# Patient Record
Sex: MALE | Race: WHITE | NOT HISPANIC OR LATINO | Employment: OTHER | URBAN - METROPOLITAN AREA
[De-identification: names, ages, dates, MRNs, and addresses within clinical notes are randomized per-mention and may not be internally consistent; named-entity substitution may affect disease eponyms.]

---

## 2022-09-09 RX ORDER — METHYLPREDNISOLONE 4 MG
1 TABLET, DOSE PACK ORAL DAILY
COMMUNITY

## 2022-09-09 RX ORDER — OMEPRAZOLE 20 MG/1
20 CAPSULE, DELAYED RELEASE ORAL DAILY
COMMUNITY
Start: 2022-06-27

## 2022-09-09 RX ORDER — ALLOPURINOL 100 MG/1
100 TABLET ORAL DAILY
COMMUNITY
Start: 2022-06-27

## 2022-09-15 ENCOUNTER — OFFICE VISIT (OUTPATIENT)
Dept: NEPHROLOGY | Facility: CLINIC | Age: 67
End: 2022-09-15
Payer: COMMERCIAL

## 2022-09-15 VITALS
SYSTOLIC BLOOD PRESSURE: 142 MMHG | DIASTOLIC BLOOD PRESSURE: 85 MMHG | WEIGHT: 177 LBS | BODY MASS INDEX: 25.34 KG/M2 | HEART RATE: 60 BPM | HEIGHT: 70 IN | OXYGEN SATURATION: 100 %

## 2022-09-15 DIAGNOSIS — N18.32 STAGE 3B CHRONIC KIDNEY DISEASE: Primary | ICD-10-CM

## 2022-09-15 PROCEDURE — 3288F FALL RISK ASSESSMENT DOCD: CPT | Mod: CPTII,S$GLB,, | Performed by: INTERNAL MEDICINE

## 2022-09-15 PROCEDURE — 1160F PR REVIEW ALL MEDS BY PRESCRIBER/CLIN PHARMACIST DOCUMENTED: ICD-10-PCS | Mod: CPTII,S$GLB,, | Performed by: INTERNAL MEDICINE

## 2022-09-15 PROCEDURE — 99213 PR OFFICE/OUTPT VISIT, EST, LEVL III, 20-29 MIN: ICD-10-PCS | Mod: S$GLB,,, | Performed by: INTERNAL MEDICINE

## 2022-09-15 PROCEDURE — 1101F PT FALLS ASSESS-DOCD LE1/YR: CPT | Mod: CPTII,S$GLB,, | Performed by: INTERNAL MEDICINE

## 2022-09-15 PROCEDURE — 3077F SYST BP >= 140 MM HG: CPT | Mod: CPTII,S$GLB,, | Performed by: INTERNAL MEDICINE

## 2022-09-15 PROCEDURE — 99213 OFFICE O/P EST LOW 20 MIN: CPT | Mod: S$GLB,,, | Performed by: INTERNAL MEDICINE

## 2022-09-15 PROCEDURE — 3079F PR MOST RECENT DIASTOLIC BLOOD PRESSURE 80-89 MM HG: ICD-10-PCS | Mod: CPTII,S$GLB,, | Performed by: INTERNAL MEDICINE

## 2022-09-15 PROCEDURE — 1160F RVW MEDS BY RX/DR IN RCRD: CPT | Mod: CPTII,S$GLB,, | Performed by: INTERNAL MEDICINE

## 2022-09-15 PROCEDURE — 3008F PR BODY MASS INDEX (BMI) DOCUMENTED: ICD-10-PCS | Mod: CPTII,S$GLB,, | Performed by: INTERNAL MEDICINE

## 2022-09-15 PROCEDURE — 1159F PR MEDICATION LIST DOCUMENTED IN MEDICAL RECORD: ICD-10-PCS | Mod: CPTII,S$GLB,, | Performed by: INTERNAL MEDICINE

## 2022-09-15 PROCEDURE — 1159F MED LIST DOCD IN RCRD: CPT | Mod: CPTII,S$GLB,, | Performed by: INTERNAL MEDICINE

## 2022-09-15 PROCEDURE — 3008F BODY MASS INDEX DOCD: CPT | Mod: CPTII,S$GLB,, | Performed by: INTERNAL MEDICINE

## 2022-09-15 PROCEDURE — 3079F DIAST BP 80-89 MM HG: CPT | Mod: CPTII,S$GLB,, | Performed by: INTERNAL MEDICINE

## 2022-09-15 PROCEDURE — 3288F PR FALLS RISK ASSESSMENT DOCUMENTED: ICD-10-PCS | Mod: CPTII,S$GLB,, | Performed by: INTERNAL MEDICINE

## 2022-09-15 PROCEDURE — 3066F PR DOCUMENTATION OF TREATMENT FOR NEPHROPATHY: ICD-10-PCS | Mod: CPTII,S$GLB,, | Performed by: INTERNAL MEDICINE

## 2022-09-15 PROCEDURE — 3077F PR MOST RECENT SYSTOLIC BLOOD PRESSURE >= 140 MM HG: ICD-10-PCS | Mod: CPTII,S$GLB,, | Performed by: INTERNAL MEDICINE

## 2022-09-15 PROCEDURE — 1101F PR PT FALLS ASSESS DOC 0-1 FALLS W/OUT INJ PAST YR: ICD-10-PCS | Mod: CPTII,S$GLB,, | Performed by: INTERNAL MEDICINE

## 2022-09-15 PROCEDURE — 3066F NEPHROPATHY DOC TX: CPT | Mod: CPTII,S$GLB,, | Performed by: INTERNAL MEDICINE

## 2022-09-15 NOTE — PROGRESS NOTES
"Nephrology Progress Note      Patient Name:  Neymar Carmichael    :  1955    Medical Record:  [id number]    Interval History:  Seeing for ckd.    Subjective:  Patient seen and examined. No complaints.    Current Medications: Current medications reviewed.    Review of Systems  Review of Systems   Respiratory:  Negative for cough, hemoptysis, sputum production and shortness of breath.    Cardiovascular:  Negative for chest pain, palpitations, orthopnea, claudication and leg swelling.   Gastrointestinal:  Negative for abdominal pain, blood in stool, constipation, diarrhea, heartburn, nausea and vomiting.   Genitourinary:  Negative for dysuria, frequency, hematuria and urgency.     Objective:      Physical Exam:   Vital Signs:  BP (!) 142/85 (BP Location: Left arm, Patient Position: Sitting, BP Method: Large (Automatic))   Pulse 60   Ht 5' 10" (1.778 m)   Wt 80.3 kg (177 lb)   SpO2 100%   BMI 25.40 kg/m²   Constitutional:  WNL  HENT:  Normocephalic, Atraumatic, Pupils Reactive, Normal oropharynx, Nose normal.   Cardiovascular:  Normal heart rate, Normal rhythm, No murmurs, No rubs, No gallops.   Respiratory:  Normal breath sounds, No respiratory distress, No wheezing, No chest tenderness.   GI:  Bowel sounds normal, Soft, No tenderness, No masses, No pulsatile masses.  : No costovertebral angle tenderness    Extremities:  Intact distal pulses, No edema, No tenderness, No cyanosis, No clubbing.   Neurologic:  Alert & oriented x 3, Normal motor function, Normal sensory function, No focal deficits noted.  Skin:  Warm and dry      Labs:  No results for input(s): K, CO2, BUN, ALBUMIN, CALCIUM, GLU, HGB, WBC, PLT, INR in the last 72 hours.    Invalid input(s): CRE  .    Radiology:   none    Assessment:  Ckd 3b. - gfr is better.      Plan:  RTC 6 months.    Electronically signed by: Sole Urban, 9/15/2022 11:50 AM.      "

## 2023-03-13 ENCOUNTER — TELEPHONE (OUTPATIENT)
Dept: NEPHROLOGY | Facility: CLINIC | Age: 68
End: 2023-03-13
Payer: COMMERCIAL

## 2023-03-13 NOTE — TELEPHONE ENCOUNTER
----- Message from Preeti Colon sent at 3/13/2023 12:08 PM CDT -----  Contact: [phone]  Type: Needs Medical Advice  Who Called:  Pt     Best Call Back Number: [phone]  Additional Information: Pt stated he has a bacterial infection and is being treated at the hospital right. Pt asking if you still want to see him for his appt on March 15. Pls call back and advise          
Notified pt to keep appt. Pt verbalized understanding.  
Normal vision: sees adequately in most situations; can see medication labels, newsprint

## 2023-03-15 ENCOUNTER — OFFICE VISIT (OUTPATIENT)
Dept: NEPHROLOGY | Facility: CLINIC | Age: 68
End: 2023-03-15
Payer: COMMERCIAL

## 2023-03-15 VITALS
WEIGHT: 183.19 LBS | HEIGHT: 70 IN | BODY MASS INDEX: 26.22 KG/M2 | SYSTOLIC BLOOD PRESSURE: 132 MMHG | OXYGEN SATURATION: 99 % | DIASTOLIC BLOOD PRESSURE: 85 MMHG | HEART RATE: 64 BPM

## 2023-03-15 DIAGNOSIS — N18.4 STAGE 4 CHRONIC KIDNEY DISEASE: Primary | ICD-10-CM

## 2023-03-15 DIAGNOSIS — N39.0 UTI (URINARY TRACT INFECTION), UNCOMPLICATED: ICD-10-CM

## 2023-03-15 DIAGNOSIS — N25.81 SECONDARY HYPERPARATHYROIDISM OF RENAL ORIGIN: ICD-10-CM

## 2023-03-15 PROCEDURE — 1126F PR PAIN SEVERITY QUANTIFIED, NO PAIN PRESENT: ICD-10-PCS | Mod: CPTII,S$GLB,, | Performed by: INTERNAL MEDICINE

## 2023-03-15 PROCEDURE — 3066F PR DOCUMENTATION OF TREATMENT FOR NEPHROPATHY: ICD-10-PCS | Mod: CPTII,S$GLB,, | Performed by: INTERNAL MEDICINE

## 2023-03-15 PROCEDURE — 3008F PR BODY MASS INDEX (BMI) DOCUMENTED: ICD-10-PCS | Mod: CPTII,S$GLB,, | Performed by: INTERNAL MEDICINE

## 2023-03-15 PROCEDURE — 3008F BODY MASS INDEX DOCD: CPT | Mod: CPTII,S$GLB,, | Performed by: INTERNAL MEDICINE

## 2023-03-15 PROCEDURE — 1160F PR REVIEW ALL MEDS BY PRESCRIBER/CLIN PHARMACIST DOCUMENTED: ICD-10-PCS | Mod: CPTII,S$GLB,, | Performed by: INTERNAL MEDICINE

## 2023-03-15 PROCEDURE — 3079F PR MOST RECENT DIASTOLIC BLOOD PRESSURE 80-89 MM HG: ICD-10-PCS | Mod: CPTII,S$GLB,, | Performed by: INTERNAL MEDICINE

## 2023-03-15 PROCEDURE — 3066F NEPHROPATHY DOC TX: CPT | Mod: CPTII,S$GLB,, | Performed by: INTERNAL MEDICINE

## 2023-03-15 PROCEDURE — 99214 OFFICE O/P EST MOD 30 MIN: CPT | Mod: S$GLB,,, | Performed by: INTERNAL MEDICINE

## 2023-03-15 PROCEDURE — 1126F AMNT PAIN NOTED NONE PRSNT: CPT | Mod: CPTII,S$GLB,, | Performed by: INTERNAL MEDICINE

## 2023-03-15 PROCEDURE — 3288F PR FALLS RISK ASSESSMENT DOCUMENTED: ICD-10-PCS | Mod: CPTII,S$GLB,, | Performed by: INTERNAL MEDICINE

## 2023-03-15 PROCEDURE — 3075F PR MOST RECENT SYSTOLIC BLOOD PRESS GE 130-139MM HG: ICD-10-PCS | Mod: CPTII,S$GLB,, | Performed by: INTERNAL MEDICINE

## 2023-03-15 PROCEDURE — 3075F SYST BP GE 130 - 139MM HG: CPT | Mod: CPTII,S$GLB,, | Performed by: INTERNAL MEDICINE

## 2023-03-15 PROCEDURE — 1101F PT FALLS ASSESS-DOCD LE1/YR: CPT | Mod: CPTII,S$GLB,, | Performed by: INTERNAL MEDICINE

## 2023-03-15 PROCEDURE — 1101F PR PT FALLS ASSESS DOC 0-1 FALLS W/OUT INJ PAST YR: ICD-10-PCS | Mod: CPTII,S$GLB,, | Performed by: INTERNAL MEDICINE

## 2023-03-15 PROCEDURE — 99214 PR OFFICE/OUTPT VISIT, EST, LEVL IV, 30-39 MIN: ICD-10-PCS | Mod: S$GLB,,, | Performed by: INTERNAL MEDICINE

## 2023-03-15 PROCEDURE — 1159F PR MEDICATION LIST DOCUMENTED IN MEDICAL RECORD: ICD-10-PCS | Mod: CPTII,S$GLB,, | Performed by: INTERNAL MEDICINE

## 2023-03-15 PROCEDURE — 3288F FALL RISK ASSESSMENT DOCD: CPT | Mod: CPTII,S$GLB,, | Performed by: INTERNAL MEDICINE

## 2023-03-15 PROCEDURE — 1159F MED LIST DOCD IN RCRD: CPT | Mod: CPTII,S$GLB,, | Performed by: INTERNAL MEDICINE

## 2023-03-15 PROCEDURE — 3079F DIAST BP 80-89 MM HG: CPT | Mod: CPTII,S$GLB,, | Performed by: INTERNAL MEDICINE

## 2023-03-15 PROCEDURE — 1160F RVW MEDS BY RX/DR IN RCRD: CPT | Mod: CPTII,S$GLB,, | Performed by: INTERNAL MEDICINE

## 2023-03-15 RX ORDER — BETHANECHOL CHLORIDE 25 MG/1
25 TABLET ORAL 2 TIMES DAILY
COMMUNITY
Start: 2023-03-08 | End: 2023-11-02

## 2023-03-15 RX ORDER — MEROPENEM 1 G/1
1 INJECTION, POWDER, FOR SOLUTION INTRAVENOUS
COMMUNITY
Start: 2023-03-13 | End: 2023-03-31

## 2023-03-15 NOTE — PROGRESS NOTES
Pt Name:  Neymar Carmichael  Pt :  1955  Pt MRN:  [id number]    Date: 3/15/2023  Provider: Sole Urban    Reason for visit: seeing for ckd.      Chief Complaint:   Chief Complaint   Patient presents with    Chronic Kidney Disease     Stage-4, Cr-2.67, GFR-26       HPI:  HPI   On iv antibx for uti. Has urology appt in 2 wks with scope. No other complaints, no gi, card, or pulm complaints.    History:   Past Medical History:   Diagnosis Date    Anemia     CKD (chronic kidney disease)     CKD (chronic kidney disease), stage IV     Diverticulosis     GERD (gastroesophageal reflux disease)     Hiatal hernia     Urinary tract infection      Past Surgical History:   Procedure Laterality Date    CHOLECYSTECTOMY      COLONOSCOPY      TONSILLECTOMY       Family History   Problem Relation Age of Onset    Stroke Father     Diabetes Father     Coronary artery disease Father     Kidney disease Maternal Grandfather      Social History     Substance and Sexual Activity   Alcohol Use Yes    Alcohol/week: 1.0 standard drink    Types: 1 Glasses of wine per week     Social History     Substance and Sexual Activity   Drug Use Never     Social History     Substance and Sexual Activity   Sexual Activity Not Currently     reports that he is not currently sexually active.  Social History     Tobacco Use   Smoking Status Former    Types: Cigars   Smokeless Tobacco Never       Allergies:  Review of patient's allergies indicates:  No Known Allergies    Current Outpatient Medications   Medication Sig Dispense Refill    allopurinoL (ZYLOPRIM) 100 MG tablet Take 100 mg by mouth once daily.      bethanechol (URECHOLINE) 25 MG Tab Take 25 mg by mouth 2 (two) times daily.      glucosamine sulfate 500 mg Tab Take 1 tablet by mouth once daily.      meropenem (MERREM) 1 gram injection 1 g.      omeprazole (PRILOSEC) 20 MG capsule Take 20 mg by mouth once daily.       No current facility-administered medications for this visit.        ROS:  "  Constitutional:  Denies fever or chills   Eyes:  Denies change in visual acuity   HENT:  Denies nasal congestion or sore throat   Respiratory:  As in the history of the present illness.   Cardiovascular:  As in the history of the present illness.   GI:  As in the history of the present illness.    Musculoskeletal:  Denies back pain or joint pain   Integument:  Denies rash   Neurologic:  Denies headache, focal weakness or sensory changes   Endocrine:  Denies polyuria or polydipsia   Lymphatic:  Denies swollen glands   Psychiatric:  Denies depression or anxiety    Physical Exam:   Vitals:   Vitals:    03/15/23 1504   BP: 132/85   Pulse: 64   SpO2: 99%   Weight: 83.1 kg (183 lb 3.2 oz)   Height: 5' 10" (1.778 m)   PainSc: 0-No pain     Body mass index is 26.29 kg/m².    Constitutional:  Well developed, well nourished, and in no acute distress   Eyes:  PERRLA, conjunctiva normal   HENT:  Atraumatic, external ears normal, nose normal.  Neck: There is no jugular venous distension or thyromegaly.   Respiratory:  No respiratory distress, normal breath sounds, no rales, no wheezing   Cardiovascular:  Normal rate, and a regular rhythm, no murmurs, no gallops, no rub, and no edema.  GI:  Normal bowel sounds.  Musculoskeletal:  No deformities.   Neurologic:  Alert & oriented x 3, CN 2-12 normal, normal motor function, and no asterixis.   Psychiatric:  Speech and behavior appropriate.    Labs/Tests:  Lab Results   Component Value Date     09/08/2022     09/08/2022    K 4.9 09/08/2022    K 4.9 09/08/2022     09/08/2022     09/08/2022    CO2 24 09/08/2022    CO2 24 09/08/2022    BUN 28 (H) 09/08/2022    BUN 28 (H) 09/08/2022    CREATININE 2.14 (H) 09/08/2022    CREATININE 2.14 (H) 09/08/2022    CREATININE 2.38 (H) 01/26/2022    GLUCOSE Negative 03/10/2023    CALCIUM 9.7 09/08/2022    CALCIUM 9.7 09/08/2022    PHOSPHORUS 3.5 09/08/2022    ALBUMIN 4.2 09/08/2022    ALBUMIN 4.2 09/08/2022    EGFRNONAA 31 (L) " 09/08/2022    EGFRNONAA 31 (L) 09/08/2022    EGFRNONAA 28 (L) 01/26/2022    ESTGFRAFRICA 36 (L) 09/08/2022    ESTGFRAFRICA 36 (L) 09/08/2022    ESTGFRAFRICA 32 (L) 01/26/2022    PTH 77 (H) 03/10/2023    HGB 12.5 03/10/2023    HGB 10.0 (L) 02/09/2023    HGB 12.2 09/08/2022    TRIG 57 03/06/2023    CHOL 189 03/06/2023    HDL 93 (H) 03/06/2023    LDLCALC 85 03/06/2023    LABVLDL 11 03/06/2023       Assessment & Plan:    Visit Diagnosis:   1. Stage 4 chronic kidney disease  Renal Function Panel    Renal Function Panel      2. UTI (urinary tract infection), uncomplicated        3. Secondary hyperparathyroidism of renal origin           Problem List: There is no problem list on file for this patient.      1. Stage 4 chronic kidney disease  -     Renal Function Panel; Future; Expected date: 05/08/2023    2. UTI (urinary tract infection), uncomplicated    3. Secondary hyperparathyroidism of renal origin         Ckd 4 - slightly worse  - prob from uti and antibx. Will recheck after completion in 2 months.    Uti - as per urologist.    Hyperparathyroidism secondary to ckd - good. Continue current tx.      Follow Up:   Follow up in about 2 months (around 5/15/2023).

## 2023-05-23 ENCOUNTER — PATIENT MESSAGE (OUTPATIENT)
Dept: RESEARCH | Facility: HOSPITAL | Age: 68
End: 2023-05-23
Payer: COMMERCIAL

## 2023-06-22 ENCOUNTER — OFFICE VISIT (OUTPATIENT)
Dept: NEPHROLOGY | Facility: CLINIC | Age: 68
End: 2023-06-22
Payer: COMMERCIAL

## 2023-06-22 VITALS
SYSTOLIC BLOOD PRESSURE: 130 MMHG | DIASTOLIC BLOOD PRESSURE: 84 MMHG | OXYGEN SATURATION: 99 % | BODY MASS INDEX: 25.34 KG/M2 | HEART RATE: 55 BPM | WEIGHT: 177 LBS | HEIGHT: 70 IN

## 2023-06-22 DIAGNOSIS — N39.0 UTI (URINARY TRACT INFECTION), UNCOMPLICATED: ICD-10-CM

## 2023-06-22 DIAGNOSIS — N25.81 SECONDARY HYPERPARATHYROIDISM OF RENAL ORIGIN: ICD-10-CM

## 2023-06-22 DIAGNOSIS — N18.32 STAGE 3B CHRONIC KIDNEY DISEASE: Primary | ICD-10-CM

## 2023-06-22 PROCEDURE — 3075F SYST BP GE 130 - 139MM HG: CPT | Mod: CPTII,S$GLB,, | Performed by: INTERNAL MEDICINE

## 2023-06-22 PROCEDURE — 3008F BODY MASS INDEX DOCD: CPT | Mod: CPTII,S$GLB,, | Performed by: INTERNAL MEDICINE

## 2023-06-22 PROCEDURE — 1101F PR PT FALLS ASSESS DOC 0-1 FALLS W/OUT INJ PAST YR: ICD-10-PCS | Mod: CPTII,S$GLB,, | Performed by: INTERNAL MEDICINE

## 2023-06-22 PROCEDURE — 3066F NEPHROPATHY DOC TX: CPT | Mod: CPTII,S$GLB,, | Performed by: INTERNAL MEDICINE

## 2023-06-22 PROCEDURE — 1101F PT FALLS ASSESS-DOCD LE1/YR: CPT | Mod: CPTII,S$GLB,, | Performed by: INTERNAL MEDICINE

## 2023-06-22 PROCEDURE — 99214 OFFICE O/P EST MOD 30 MIN: CPT | Mod: S$GLB,,, | Performed by: INTERNAL MEDICINE

## 2023-06-22 PROCEDURE — 1160F PR REVIEW ALL MEDS BY PRESCRIBER/CLIN PHARMACIST DOCUMENTED: ICD-10-PCS | Mod: CPTII,S$GLB,, | Performed by: INTERNAL MEDICINE

## 2023-06-22 PROCEDURE — 3288F PR FALLS RISK ASSESSMENT DOCUMENTED: ICD-10-PCS | Mod: CPTII,S$GLB,, | Performed by: INTERNAL MEDICINE

## 2023-06-22 PROCEDURE — 1159F PR MEDICATION LIST DOCUMENTED IN MEDICAL RECORD: ICD-10-PCS | Mod: CPTII,S$GLB,, | Performed by: INTERNAL MEDICINE

## 2023-06-22 PROCEDURE — 1159F MED LIST DOCD IN RCRD: CPT | Mod: CPTII,S$GLB,, | Performed by: INTERNAL MEDICINE

## 2023-06-22 PROCEDURE — 1126F AMNT PAIN NOTED NONE PRSNT: CPT | Mod: CPTII,S$GLB,, | Performed by: INTERNAL MEDICINE

## 2023-06-22 PROCEDURE — 3008F PR BODY MASS INDEX (BMI) DOCUMENTED: ICD-10-PCS | Mod: CPTII,S$GLB,, | Performed by: INTERNAL MEDICINE

## 2023-06-22 PROCEDURE — 3288F FALL RISK ASSESSMENT DOCD: CPT | Mod: CPTII,S$GLB,, | Performed by: INTERNAL MEDICINE

## 2023-06-22 PROCEDURE — 3079F DIAST BP 80-89 MM HG: CPT | Mod: CPTII,S$GLB,, | Performed by: INTERNAL MEDICINE

## 2023-06-22 PROCEDURE — 99214 PR OFFICE/OUTPT VISIT, EST, LEVL IV, 30-39 MIN: ICD-10-PCS | Mod: S$GLB,,, | Performed by: INTERNAL MEDICINE

## 2023-06-22 PROCEDURE — 3079F PR MOST RECENT DIASTOLIC BLOOD PRESSURE 80-89 MM HG: ICD-10-PCS | Mod: CPTII,S$GLB,, | Performed by: INTERNAL MEDICINE

## 2023-06-22 PROCEDURE — 3075F PR MOST RECENT SYSTOLIC BLOOD PRESS GE 130-139MM HG: ICD-10-PCS | Mod: CPTII,S$GLB,, | Performed by: INTERNAL MEDICINE

## 2023-06-22 PROCEDURE — 1160F RVW MEDS BY RX/DR IN RCRD: CPT | Mod: CPTII,S$GLB,, | Performed by: INTERNAL MEDICINE

## 2023-06-22 PROCEDURE — 3066F PR DOCUMENTATION OF TREATMENT FOR NEPHROPATHY: ICD-10-PCS | Mod: CPTII,S$GLB,, | Performed by: INTERNAL MEDICINE

## 2023-06-22 PROCEDURE — 1126F PR PAIN SEVERITY QUANTIFIED, NO PAIN PRESENT: ICD-10-PCS | Mod: CPTII,S$GLB,, | Performed by: INTERNAL MEDICINE

## 2023-06-22 NOTE — PROGRESS NOTES
Pt Name:  Neymar Carmichael  Pt :  1955  Pt MRN:  [id number]    Date: 2023  Provider: Sole Urban    Reason for visit:   Seeing for ckd.      Chief Complaint:   Chief Complaint   Patient presents with    Chronic Kidney Disease     Serum creatinine 2.29, GFR 31, CKD stage 3       HPI:  HPI   No new complaints. Still on antibx for UTI - seeing urologist. Scope neg. No pulm or card complaints. Sometimes has cloudy urine.      History:   Past Medical History:   Diagnosis Date    Anemia     CKD (chronic kidney disease)     CKD (chronic kidney disease), stage IV     Diverticulosis     GERD (gastroesophageal reflux disease)     Hiatal hernia     Urinary tract infection      Past Surgical History:   Procedure Laterality Date    CHOLECYSTECTOMY      COLONOSCOPY      TONSILLECTOMY       Family History   Problem Relation Age of Onset    Stroke Father     Diabetes Father     Coronary artery disease Father     Kidney disease Maternal Grandfather      Social History     Substance and Sexual Activity   Alcohol Use Yes    Alcohol/week: 1.0 standard drink    Types: 1 Glasses of wine per week     Social History     Substance and Sexual Activity   Drug Use Never     Social History     Substance and Sexual Activity   Sexual Activity Not Currently     reports that he is not currently sexually active.  Social History     Tobacco Use   Smoking Status Some Days    Types: Cigars   Smokeless Tobacco Never       Allergies:  Review of patient's allergies indicates:  No Known Allergies    Current Outpatient Medications   Medication Sig Dispense Refill    allopurinoL (ZYLOPRIM) 100 MG tablet Take 100 mg by mouth once daily.      bethanechol (URECHOLINE) 25 MG Tab Take 25 mg by mouth 2 (two) times daily.      glucosamine sulfate 500 mg Tab Take 1 tablet by mouth once daily.      omeprazole (PRILOSEC) 20 MG capsule Take 20 mg by mouth once daily.       No current facility-administered medications for this visit.        ROS:  "  Constitutional:  Denies fever or chills   Eyes:  Denies change in visual acuity   HENT:  Denies nasal congestion or sore throat   Respiratory:  As in the history of the present illness.   Cardiovascular:  As in the history of the present illness.   GI:  As in the history of the present illness.    Musculoskeletal:  Denies back pain or joint pain   Integument:  Denies rash   Neurologic:  Denies headache, focal weakness or sensory changes   Endocrine:  Denies polyuria or polydipsia   Lymphatic:  Denies swollen glands   Psychiatric:  Denies depression or anxiety    Physical Exam:   Vitals:   Vitals:    06/22/23 1012   BP: 130/84   Pulse: (!) 55   SpO2: 99%   Weight: 80.3 kg (177 lb)   Height: 5' 10" (1.778 m)   PainSc: 0-No pain     Body mass index is 25.4 kg/m².    Constitutional:  Well developed, well nourished, and in no acute distress   Eyes:  PERRLA, conjunctiva normal   HENT:  Atraumatic, external ears normal, nose normal.  Neck: There is no jugular venous distension or thyromegaly.   Respiratory:  No respiratory distress, normal breath sounds, no rales, no wheezing   Cardiovascular:  Normal rate, and a regular rhythm, no murmurs, no gallops, no rub, and no edema.  GI:  Normal bowel sounds.  Musculoskeletal:  No deformities.   Neurologic:  Alert & oriented x 3, CN 2-12 normal, normal motor function, and no asterixis.   Psychiatric:  Speech and behavior appropriate.    Labs/Tests:  Lab Results   Component Value Date     09/08/2022     09/08/2022    K 4.9 09/08/2022    K 4.9 09/08/2022     09/08/2022     09/08/2022    CO2 24 09/08/2022    CO2 24 09/08/2022    BUN 28 (H) 09/08/2022    BUN 28 (H) 09/08/2022    CREATININE 2.14 (H) 09/08/2022    CREATININE 2.14 (H) 09/08/2022    CREATININE 2.38 (H) 01/26/2022    GLUCOSE Negative 03/10/2023    CALCIUM 9.7 09/08/2022    CALCIUM 9.7 09/08/2022    PHOSPHORUS 3.5 09/08/2022    ALBUMIN 4.2 09/08/2022    ALBUMIN 4.2 09/08/2022    EGFRNONAA 31 (L) " 09/08/2022    EGFRNONAA 31 (L) 09/08/2022    EGFRNONAA 28 (L) 01/26/2022    ESTGFRAFRICA 36 (L) 09/08/2022    ESTGFRAFRICA 36 (L) 09/08/2022    ESTGFRAFRICA 32 (L) 01/26/2022    PTH 77 (H) 03/10/2023    HGB 13.4 03/27/2023    HGB 12.3 03/21/2023    HGB 12.5 03/10/2023    TRIG 57 03/06/2023    CHOL 189 03/06/2023    HDL 93 (H) 03/06/2023    LDLCALC 85 03/06/2023    LABVLDL 11 03/06/2023       Assessment & Plan:    Visit Diagnosis:   1. Stage 3b chronic kidney disease  Urinalysis    Renal Function Panel    PTH, Intact    CBC Auto Differential    Urinalysis    Renal Function Panel    PTH, Intact    CBC Auto Differential      2. UTI (urinary tract infection), uncomplicated  Urinalysis    Urinalysis      3. Secondary hyperparathyroidism of renal origin           Problem List: There is no problem list on file for this patient.  =  Ckd 3a - cr is better.    UTI - pt states going to [facility] next month.    Hyperparathyroidism secondary to ckd - well controlled.    1. Stage 3b chronic kidney disease  -     Urinalysis; Future; Expected date: 10/02/2023  -     Renal Function Panel; Future; Expected date: 10/02/2023  -     PTH, Intact; Future; Expected date: 10/02/2023  -     CBC Auto Differential; Future; Expected date: 10/02/2023    2. UTI (urinary tract infection), uncomplicated  -     Urinalysis; Future; Expected date: 10/02/2023    3. Secondary hyperparathyroidism of renal origin             Follow Up:   No follow-ups on file.

## 2023-11-02 ENCOUNTER — OFFICE VISIT (OUTPATIENT)
Dept: NEPHROLOGY | Facility: CLINIC | Age: 68
End: 2023-11-02
Payer: COMMERCIAL

## 2023-11-02 VITALS
DIASTOLIC BLOOD PRESSURE: 93 MMHG | SYSTOLIC BLOOD PRESSURE: 150 MMHG | BODY MASS INDEX: 26.03 KG/M2 | HEIGHT: 70 IN | HEART RATE: 61 BPM | WEIGHT: 181.81 LBS | OXYGEN SATURATION: 98 %

## 2023-11-02 DIAGNOSIS — I10 PRIMARY HYPERTENSION: ICD-10-CM

## 2023-11-02 DIAGNOSIS — N18.4 STAGE 4 CHRONIC KIDNEY DISEASE: Primary | ICD-10-CM

## 2023-11-02 PROCEDURE — 3077F SYST BP >= 140 MM HG: CPT | Mod: CPTII,S$GLB,, | Performed by: INTERNAL MEDICINE

## 2023-11-02 PROCEDURE — 1101F PR PT FALLS ASSESS DOC 0-1 FALLS W/OUT INJ PAST YR: ICD-10-PCS | Mod: CPTII,S$GLB,, | Performed by: INTERNAL MEDICINE

## 2023-11-02 PROCEDURE — 99214 OFFICE O/P EST MOD 30 MIN: CPT | Mod: S$GLB,,, | Performed by: INTERNAL MEDICINE

## 2023-11-02 PROCEDURE — 3288F FALL RISK ASSESSMENT DOCD: CPT | Mod: CPTII,S$GLB,, | Performed by: INTERNAL MEDICINE

## 2023-11-02 PROCEDURE — 3008F BODY MASS INDEX DOCD: CPT | Mod: CPTII,S$GLB,, | Performed by: INTERNAL MEDICINE

## 2023-11-02 PROCEDURE — 1101F PT FALLS ASSESS-DOCD LE1/YR: CPT | Mod: CPTII,S$GLB,, | Performed by: INTERNAL MEDICINE

## 2023-11-02 PROCEDURE — 3080F DIAST BP >= 90 MM HG: CPT | Mod: CPTII,S$GLB,, | Performed by: INTERNAL MEDICINE

## 2023-11-02 PROCEDURE — 3066F NEPHROPATHY DOC TX: CPT | Mod: CPTII,S$GLB,, | Performed by: INTERNAL MEDICINE

## 2023-11-02 PROCEDURE — 1159F PR MEDICATION LIST DOCUMENTED IN MEDICAL RECORD: ICD-10-PCS | Mod: CPTII,S$GLB,, | Performed by: INTERNAL MEDICINE

## 2023-11-02 PROCEDURE — 3066F PR DOCUMENTATION OF TREATMENT FOR NEPHROPATHY: ICD-10-PCS | Mod: CPTII,S$GLB,, | Performed by: INTERNAL MEDICINE

## 2023-11-02 PROCEDURE — 3008F PR BODY MASS INDEX (BMI) DOCUMENTED: ICD-10-PCS | Mod: CPTII,S$GLB,, | Performed by: INTERNAL MEDICINE

## 2023-11-02 PROCEDURE — 3288F PR FALLS RISK ASSESSMENT DOCUMENTED: ICD-10-PCS | Mod: CPTII,S$GLB,, | Performed by: INTERNAL MEDICINE

## 2023-11-02 PROCEDURE — 3077F PR MOST RECENT SYSTOLIC BLOOD PRESSURE >= 140 MM HG: ICD-10-PCS | Mod: CPTII,S$GLB,, | Performed by: INTERNAL MEDICINE

## 2023-11-02 PROCEDURE — 1160F PR REVIEW ALL MEDS BY PRESCRIBER/CLIN PHARMACIST DOCUMENTED: ICD-10-PCS | Mod: CPTII,S$GLB,, | Performed by: INTERNAL MEDICINE

## 2023-11-02 PROCEDURE — 1160F RVW MEDS BY RX/DR IN RCRD: CPT | Mod: CPTII,S$GLB,, | Performed by: INTERNAL MEDICINE

## 2023-11-02 PROCEDURE — 99214 PR OFFICE/OUTPT VISIT, EST, LEVL IV, 30-39 MIN: ICD-10-PCS | Mod: S$GLB,,, | Performed by: INTERNAL MEDICINE

## 2023-11-02 PROCEDURE — 3080F PR MOST RECENT DIASTOLIC BLOOD PRESSURE >= 90 MM HG: ICD-10-PCS | Mod: CPTII,S$GLB,, | Performed by: INTERNAL MEDICINE

## 2023-11-02 PROCEDURE — 1159F MED LIST DOCD IN RCRD: CPT | Mod: CPTII,S$GLB,, | Performed by: INTERNAL MEDICINE

## 2023-11-02 NOTE — PROGRESS NOTES
Pt Name:  Neymar Carmichael  Pt :  1955  Pt MRN:  [id number]    Date: 2023  Provider: Sole Urban    Reason for visit: seeing for ckd.      Chief Complaint:   Chief Complaint   Patient presents with    Chronic Kidney Disease     Cre:2.45 gfr:28 ckd: 4       HPI:  HPI   No complaints. No gi, gu, pulm or card symptoms.    History:   Past Medical History:   Diagnosis Date    Anemia     CKD (chronic kidney disease)     CKD (chronic kidney disease), stage IV     Diverticulosis     GERD (gastroesophageal reflux disease)     Hiatal hernia     Urinary tract infection      Past Surgical History:   Procedure Laterality Date    CHOLECYSTECTOMY      COLONOSCOPY      CYSTECTOMY      PROSTATE SURGERY      TONSILLECTOMY       Family History   Problem Relation Age of Onset    Stroke Father     Diabetes Father     Coronary artery disease Father     Kidney disease Maternal Grandfather      Social History     Substance and Sexual Activity   Alcohol Use Yes    Alcohol/week: 1.0 standard drink of alcohol    Types: 1 Glasses of wine per week     Social History     Substance and Sexual Activity   Drug Use Never     Social History     Substance and Sexual Activity   Sexual Activity Not Currently     reports that he is not currently sexually active.  Social History     Tobacco Use   Smoking Status Some Days    Types: Cigars   Smokeless Tobacco Never       Allergies:  Review of patient's allergies indicates:   Allergen Reactions    Tree and shrub pollen Other (See Comments)     Sinus issues       Current Outpatient Medications   Medication Sig Dispense Refill    allopurinoL (ZYLOPRIM) 100 MG tablet Take 100 mg by mouth once daily.      glucosamine sulfate 500 mg Tab Take 1 tablet by mouth once daily.      omeprazole (PRILOSEC) 20 MG capsule Take 20 mg by mouth once daily.       No current facility-administered medications for this visit.        ROS:   Constitutional:  Denies fever or chills   Eyes:  Denies change in visual  "acuity   HENT:  Denies nasal congestion or sore throat   Respiratory:  As in the history of the present illness.   Cardiovascular:  As in the history of the present illness.   GI:  As in the history of the present illness.    Musculoskeletal:  Denies back pain or joint pain   Integument:  Denies rash   Neurologic:  Denies headache, focal weakness or sensory changes   Endocrine:  Denies polyuria or polydipsia   Lymphatic:  Denies swollen glands   Psychiatric:  Denies depression or anxiety    Physical Exam:   Vitals:   Vitals:    11/02/23 1008   BP: (!) 150/93   Pulse: 61   SpO2: 98%   Weight: 82.5 kg (181 lb 12.8 oz)   Height: 5' 10" (1.778 m)     Body mass index is 26.09 kg/m².    Constitutional:  Well developed, well nourished, and in no acute distress   Eyes:  PERRLA, conjunctiva normal   HENT:  Atraumatic, external ears normal, nose normal.  Neck: There is no jugular venous distension or thyromegaly.   Respiratory:  No respiratory distress, normal breath sounds, no rales, no wheezing   Cardiovascular:  Normal rate, and a regular rhythm, no murmurs, no gallops, no rub, and no edema.  GI:  Normal bowel sounds.  Musculoskeletal:  No deformities.   Neurologic:  Alert & oriented x 3, CN 2-12 normal, normal motor function, and no asterixis.   Psychiatric:  Speech and behavior appropriate.    Labs/Tests:  Lab Results   Component Value Date     09/08/2022     09/08/2022    K 4.9 09/08/2022    K 4.9 09/08/2022     09/08/2022     09/08/2022    CO2 24 09/08/2022    CO2 24 09/08/2022    BUN 28 (H) 09/08/2022    BUN 28 (H) 09/08/2022    CREATININE 2.14 (H) 09/08/2022    CREATININE 2.14 (H) 09/08/2022    CREATININE 2.38 (H) 01/26/2022    GLUCOSE Negative 10/30/2023    CALCIUM 9.7 09/08/2022    CALCIUM 9.7 09/08/2022    PHOSPHORUS 3.5 09/08/2022    ALBUMIN 4.2 09/08/2022    ALBUMIN 4.2 09/08/2022    EGFRNONAA 31 (L) 09/08/2022    EGFRNONAA 31 (L) 09/08/2022    EGFRNONAA 28 (L) 01/26/2022    ESTGFRAFVANDANA " 36 (L) 09/08/2022    ESTGFRAFRICA 36 (L) 09/08/2022    ESTGFRAFRICA 32 (L) 01/26/2022    PTH 60 10/30/2023    HGB 13.4 03/27/2023    HGB 12.3 03/21/2023    HGB 12.5 03/10/2023    TRIG 57 03/06/2023    CHOL 189 03/06/2023    HDL 93 (H) 03/06/2023    LDLCALC 85 03/06/2023    LABVLDL 11 03/06/2023       Assessment & Plan:    Visit Diagnosis:   1. Stage 4 chronic kidney disease  Renal Function Panel    PTH, Intact    CBC Auto Differential    Urinalysis    Renal Function Panel    PTH, Intact    CBC Auto Differential    Urinalysis      2. Primary hypertension           Problem List: There is no problem list on file for this patient.  =  Ckd 4 - gfr slightly better.    Htn - bp better at home.    1. Stage 4 chronic kidney disease  -     Renal Function Panel; Future; Expected date: 05/02/2024  -     PTH, Intact; Future; Expected date: 05/02/2024  -     CBC Auto Differential; Future; Expected date: 05/02/2024  -     Urinalysis; Future; Expected date: 05/02/2024    2. Primary hypertension             Follow Up:   Follow up in about 6 months (around 5/2/2024).